# Patient Record
Sex: FEMALE | Race: WHITE
[De-identification: names, ages, dates, MRNs, and addresses within clinical notes are randomized per-mention and may not be internally consistent; named-entity substitution may affect disease eponyms.]

---

## 2017-05-11 ENCOUNTER — APPOINTMENT (OUTPATIENT)
Dept: PULMONOLOGY | Facility: CLINIC | Age: 72
End: 2017-05-11

## 2017-05-11 VITALS
BODY MASS INDEX: 18.78 KG/M2 | HEART RATE: 49 BPM | RESPIRATION RATE: 16 BRPM | HEIGHT: 64 IN | SYSTOLIC BLOOD PRESSURE: 132 MMHG | DIASTOLIC BLOOD PRESSURE: 70 MMHG | WEIGHT: 110 LBS | OXYGEN SATURATION: 99 %

## 2017-05-11 DIAGNOSIS — Z80.0 FAMILY HISTORY OF MALIGNANT NEOPLASM OF DIGESTIVE ORGANS: ICD-10-CM

## 2017-05-11 DIAGNOSIS — M50.321 OTHER CERVICAL DISC DEGENERATION AT C4-C5 LEVEL: ICD-10-CM

## 2017-05-11 DIAGNOSIS — Z78.9 OTHER SPECIFIED HEALTH STATUS: ICD-10-CM

## 2017-05-11 DIAGNOSIS — Z87.891 PERSONAL HISTORY OF NICOTINE DEPENDENCE: ICD-10-CM

## 2017-05-11 DIAGNOSIS — M43.10 SPONDYLOLISTHESIS, SITE UNSPECIFIED: ICD-10-CM

## 2017-05-11 DIAGNOSIS — O00.90 UNSPECIFIED. ECTOPIC. PREGNANCY WITHOUT INTRAUTERINE PREGNANCY: ICD-10-CM

## 2017-05-11 DIAGNOSIS — Z87.01 PERSONAL HISTORY OF PNEUMONIA (RECURRENT): ICD-10-CM

## 2017-05-11 DIAGNOSIS — Z81.8 FAMILY HISTORY OF OTHER MENTAL AND BEHAVIORAL DISORDERS: ICD-10-CM

## 2017-05-11 RX ORDER — POTASSIUM CITRATE 15 MEQ/1
15 MEQ TABLET, EXTENDED RELEASE ORAL
Qty: 60 | Refills: 0 | Status: ACTIVE | COMMUNITY
Start: 2017-04-25

## 2017-05-11 RX ORDER — FAMOTIDINE 40 MG/1
40 TABLET, FILM COATED ORAL
Qty: 30 | Refills: 0 | Status: ACTIVE | COMMUNITY
Start: 2017-03-16

## 2017-05-11 RX ORDER — CICLOPIROX 80 MG/ML
8 SOLUTION TOPICAL
Qty: 1 | Refills: 0 | Status: ACTIVE | COMMUNITY
Start: 2017-03-20

## 2017-09-14 ENCOUNTER — APPOINTMENT (OUTPATIENT)
Dept: PULMONOLOGY | Facility: CLINIC | Age: 72
End: 2017-09-14
Payer: MEDICARE

## 2017-09-14 VITALS
OXYGEN SATURATION: 98 % | HEIGHT: 64 IN | HEART RATE: 60 BPM | BODY MASS INDEX: 18.44 KG/M2 | SYSTOLIC BLOOD PRESSURE: 100 MMHG | RESPIRATION RATE: 17 BRPM | WEIGHT: 108 LBS | DIASTOLIC BLOOD PRESSURE: 72 MMHG

## 2017-09-14 DIAGNOSIS — Z87.01 PERSONAL HISTORY OF PNEUMONIA (RECURRENT): ICD-10-CM

## 2017-09-14 PROCEDURE — 94010 BREATHING CAPACITY TEST: CPT

## 2017-09-14 PROCEDURE — 99214 OFFICE O/P EST MOD 30 MIN: CPT | Mod: 25

## 2017-09-18 ENCOUNTER — APPOINTMENT (OUTPATIENT)
Dept: NEPHROLOGY | Facility: CLINIC | Age: 72
End: 2017-09-18
Payer: MEDICARE

## 2017-09-18 VITALS
HEIGHT: 64 IN | WEIGHT: 108 LBS | SYSTOLIC BLOOD PRESSURE: 118 MMHG | DIASTOLIC BLOOD PRESSURE: 64 MMHG | BODY MASS INDEX: 18.44 KG/M2

## 2017-09-18 DIAGNOSIS — Z87.442 PERSONAL HISTORY OF URINARY CALCULI: ICD-10-CM

## 2017-09-18 DIAGNOSIS — R82.99 OTHER ABNORMAL FINDINGS IN URINE: ICD-10-CM

## 2017-09-18 DIAGNOSIS — E72.53 PRIMARY HYPEROXALURIA: ICD-10-CM

## 2017-09-18 DIAGNOSIS — Z85.820 PERSONAL HISTORY OF MALIGNANT MELANOMA OF SKIN: ICD-10-CM

## 2017-09-18 PROCEDURE — 99205 OFFICE O/P NEW HI 60 MIN: CPT

## 2017-09-18 RX ORDER — LORATADINE 10 MG
TABLET,DISINTEGRATING ORAL
Refills: 0 | Status: ACTIVE | COMMUNITY

## 2017-09-18 RX ORDER — DOCUSATE SODIUM 100 MG/1
CAPSULE ORAL
Refills: 0 | Status: ACTIVE | COMMUNITY

## 2017-09-18 RX ORDER — VITAMIN B COMPLEX
CAPSULE ORAL
Refills: 0 | Status: ACTIVE | COMMUNITY

## 2017-09-21 PROBLEM — Z85.820 HISTORY OF MALIGNANT MELANOMA OF SKIN: Status: RESOLVED | Noted: 2017-09-21 | Resolved: 2017-09-21

## 2018-01-16 ENCOUNTER — APPOINTMENT (OUTPATIENT)
Dept: PULMONOLOGY | Facility: CLINIC | Age: 73
End: 2018-01-16
Payer: MEDICARE

## 2018-01-16 VITALS
BODY MASS INDEX: 18.44 KG/M2 | SYSTOLIC BLOOD PRESSURE: 118 MMHG | OXYGEN SATURATION: 97 % | HEART RATE: 61 BPM | HEIGHT: 64 IN | WEIGHT: 108 LBS | DIASTOLIC BLOOD PRESSURE: 70 MMHG

## 2018-01-16 PROCEDURE — 94010 BREATHING CAPACITY TEST: CPT

## 2018-01-16 PROCEDURE — 99214 OFFICE O/P EST MOD 30 MIN: CPT | Mod: 25

## 2018-01-19 ENCOUNTER — LABORATORY RESULT (OUTPATIENT)
Age: 73
End: 2018-01-19

## 2018-01-19 ENCOUNTER — APPOINTMENT (OUTPATIENT)
Dept: CARDIOLOGY | Facility: CLINIC | Age: 73
End: 2018-01-19
Payer: MEDICARE

## 2018-01-19 PROCEDURE — 93880 EXTRACRANIAL BILAT STUDY: CPT

## 2018-01-20 LAB
BASOPHILS # BLD AUTO: 0.03 K/UL
BASOPHILS NFR BLD AUTO: 0.6 %
EOSINOPHIL # BLD AUTO: 0.07 K/UL
EOSINOPHIL NFR BLD AUTO: 1.4 %
HCT VFR BLD CALC: 35.5 %
HGB BLD-MCNC: 11.7 G/DL
IGE SER-MCNC: 35 IU/ML
IMM GRANULOCYTES NFR BLD AUTO: 0.2 %
LYMPHOCYTES # BLD AUTO: 1.51 K/UL
LYMPHOCYTES NFR BLD AUTO: 31 %
MAN DIFF?: NORMAL
MCHC RBC-ENTMCNC: 30.9 PG
MCHC RBC-ENTMCNC: 33 GM/DL
MCV RBC AUTO: 93.7 FL
MONOCYTES # BLD AUTO: 0.48 K/UL
MONOCYTES NFR BLD AUTO: 9.9 %
NEUTROPHILS # BLD AUTO: 2.77 K/UL
NEUTROPHILS NFR BLD AUTO: 56.9 %
PLATELET # BLD AUTO: 326 K/UL
RBC # BLD: 3.79 M/UL
RBC # FLD: 15.1 %
WBC # FLD AUTO: 4.87 K/UL

## 2018-01-22 LAB
A ALTERNATA IGE QN: <0.1 KUA/L
A FUMIGATUS IGE QN: <0.1 KUA/L
C ALBICANS IGE QN: <0.1 KUA/L
C HERBARUM IGE QN: <0.1 KUA/L
CAT DANDER IGE QN: <0.1 KUA/L
CLAM IGE QN: <0.1 KUA/L
CODFISH IGE QN: <0.1 KUA/L
COMMON RAGWEED IGE QN: <0.1 KUA/L
CORN IGE QN: <0.1 KUA/L
COW MILK IGE QN: <0.1 KUA/L
D FARINAE IGE QN: <0.1 KUA/L
D PTERONYSS IGE QN: <0.1 KUA/L
DEPRECATED A ALTERNATA IGE RAST QL: 0
DEPRECATED A FUMIGATUS IGE RAST QL: 0
DEPRECATED C ALBICANS IGE RAST QL: 0
DEPRECATED C HERBARUM IGE RAST QL: 0
DEPRECATED CAT DANDER IGE RAST QL: 0
DEPRECATED CLAM IGE RAST QL: 0
DEPRECATED CODFISH IGE RAST QL: 0
DEPRECATED COMMON RAGWEED IGE RAST QL: 0
DEPRECATED CORN IGE RAST QL: 0
DEPRECATED COW MILK IGE RAST QL: 0
DEPRECATED D FARINAE IGE RAST QL: 0
DEPRECATED D PTERONYSS IGE RAST QL: 0
DEPRECATED DOG DANDER IGE RAST QL: 0
DEPRECATED EGG WHITE IGE RAST QL: 0
DEPRECATED M RACEMOSUS IGE RAST QL: 0
DEPRECATED PEANUT IGE RAST QL: 0
DEPRECATED ROACH IGE RAST QL: 0
DEPRECATED SCALLOP IGE RAST QL: <0.1 KUA/L
DEPRECATED SESAME SEED IGE RAST QL: 0
DEPRECATED SHRIMP IGE RAST QL: 0
DEPRECATED SOYBEAN IGE RAST QL: 0
DEPRECATED TIMOTHY IGE RAST QL: 0
DEPRECATED WALNUT IGE RAST QL: 0
DEPRECATED WHEAT IGE RAST QL: 0
DEPRECATED WHITE OAK IGE RAST QL: 0
DOG DANDER IGE QN: <0.1 KUA/L
EGG WHITE IGE QN: <0.1 KUA/L
M RACEMOSUS IGE QN: <0.1 KUA/L
PEANUT IGE QN: <0.1 KUA/L
ROACH IGE QN: <0.1 KUA/L
SCALLOP IGE QN: 0
SCALLOP IGE QN: <0.1 KUA/L
SESAME SEED IGE QN: <0.1 KUA/L
SOYBEAN IGE QN: <0.1 KUA/L
TIMOTHY IGE QN: <0.1 KUA/L
WALNUT IGE QN: <0.1 KUA/L
WHEAT IGE QN: <0.1 KUA/L
WHITE OAK IGE QN: <0.1 KUA/L

## 2018-01-23 ENCOUNTER — MOBILE ON CALL (OUTPATIENT)
Age: 73
End: 2018-01-23

## 2018-07-24 ENCOUNTER — APPOINTMENT (OUTPATIENT)
Dept: PULMONOLOGY | Facility: CLINIC | Age: 73
End: 2018-07-24
Payer: MEDICARE

## 2018-07-24 ENCOUNTER — NON-APPOINTMENT (OUTPATIENT)
Age: 73
End: 2018-07-24

## 2018-07-24 VITALS
HEIGHT: 64 IN | BODY MASS INDEX: 18.78 KG/M2 | WEIGHT: 110 LBS | SYSTOLIC BLOOD PRESSURE: 120 MMHG | DIASTOLIC BLOOD PRESSURE: 82 MMHG | RESPIRATION RATE: 14 BRPM | HEART RATE: 55 BPM | OXYGEN SATURATION: 99 %

## 2018-07-24 PROCEDURE — 99214 OFFICE O/P EST MOD 30 MIN: CPT | Mod: 25

## 2018-07-24 PROCEDURE — 94010 BREATHING CAPACITY TEST: CPT

## 2019-01-15 ENCOUNTER — APPOINTMENT (OUTPATIENT)
Dept: PULMONOLOGY | Facility: CLINIC | Age: 74
End: 2019-01-15
Payer: MEDICARE

## 2019-01-15 ENCOUNTER — NON-APPOINTMENT (OUTPATIENT)
Age: 74
End: 2019-01-15

## 2019-01-15 VITALS
BODY MASS INDEX: 1.88 KG/M2 | DIASTOLIC BLOOD PRESSURE: 60 MMHG | OXYGEN SATURATION: 98 % | RESPIRATION RATE: 17 BRPM | SYSTOLIC BLOOD PRESSURE: 122 MMHG | HEIGHT: 64 IN | HEART RATE: 58 BPM | WEIGHT: 11 LBS

## 2019-01-15 PROCEDURE — 94010 BREATHING CAPACITY TEST: CPT

## 2019-01-15 PROCEDURE — 99214 OFFICE O/P EST MOD 30 MIN: CPT | Mod: 25

## 2019-01-15 NOTE — HISTORY OF PRESENT ILLNESS
[FreeTextEntry1] : Ms. Peterson is a 73 year old female with a history of abnormal chest CT, allergic reaction, asbestos exposure, GERD and palpitations presenting to the office today for a follow up visit. Her chief complaint is back issues \par - SHe comes in stating that she is feeling generally well.\par - She is exercising regularly and notes only mild limitations related to body stiffness. \par - Her energy level is 8-9/10\par - She describes 3x per year in which she experiences palpitations for about 5 seconds\par - Her heartburn and reflux is under control\par - SHe is sleeping well and getting enough sleep (7-8 hours)\par - She reports waking up rested. \par - Her sinuses are fine. \par - She denies any headaches, nausea, vomiting, fever, chills, sweats, chest pain, chest pressure, SOB, coughing, wheezing, fatigue, diarrhea, constipation, dysphagia, myalgias, dizziness, leg swelling, leg pain, itchy eyes, itchy ears, or sour taste in the mouth.

## 2019-01-15 NOTE — REASON FOR VISIT
[Follow-Up] : a follow-up visit [Spouse] : spouse [FreeTextEntry1] : abnormal chest CT, allergic reaction, asbestos exposure, GERD and palpitations

## 2019-01-15 NOTE — PROCEDURE
[FreeTextEntry1] : PFT- spi reveals normal flows; FEV1 was 2.05L which is 96% of predicted; normal flow volume loop \par \par She had a CT chest scan performed on 1/8/2019, which showed no change from CT scan 7/9/2018.

## 2019-01-15 NOTE — ASSESSMENT
[FreeTextEntry1] : Ms. Peterson is a 72 year old female with a history of spondyloschises , HLD, palpitations, abnormal chest CT here for a pulmonary reevaluation and is stable. 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 No gold standard test for diagnosis. The tuberculin skin test limited sensitivity and specificity, as there is  false positives in people who have received BCG; false negatives in people with impaired T-Cell infection. Interferon gamma- release assays, more specific; similar sensitivity to TB skin test (not influenced by BCG).\par \par problem 3: GERD/ LPR \par -continue to use Pepcid 30 mg QAM \par -recommended to use DGL before meals\par -hand outs provided to the pt \par \par -Rule of 2s: avoid eating too much, eating too late, eating too spicy, eating two hours before bed\par -Things to avoid including overeating, spicy foods, tight clothing, eating within three hours of bed, this list is not all inclusive. \par -For treatment of reflux, possible options discussed including diet control, H2 blockers, PPIs, as well as coating motility agents discussed as treatment options. Timing of meals and proximity of last meal to sleep were discussed. If symptoms persist, a formal gastrointestinal evaluation is needed. \par \par Problem 4: Abnormal PFT's \par -improved at this visit and continue to improve - best at present \par \par problem 6: allergic profile\par - S/p blood work to include: IgE level, eosinophil level, food IgE level, asthma profile, and vitamin D (normal) \par -recommended to use "Navage" nasal rinse device\par -continue Flonase / Astelin 0.5%  1 sniff BID \par \par problem 5: health maintenance \par - Received an influenza vaccine 2018\par -recommended strep pneumonia vaccines: Prevnar-13 vaccine, followed by Pneumo vaccine 23 one year following\par -recommended early intervention for URIs\par -recommended regular osteoporosis evaluations\par -recommended early dermatological evaluations\par -recommended after the age of 50 to the age of 70, colonoscopy every 5 years \par \par F/U in 3-4 months \par She is encouraged to call with any changes, concerns, or questions

## 2019-10-04 ENCOUNTER — NON-APPOINTMENT (OUTPATIENT)
Age: 74
End: 2019-10-04

## 2019-10-04 ENCOUNTER — APPOINTMENT (OUTPATIENT)
Dept: PULMONOLOGY | Facility: CLINIC | Age: 74
End: 2019-10-04
Payer: MEDICARE

## 2019-10-04 VITALS
WEIGHT: 110 LBS | SYSTOLIC BLOOD PRESSURE: 120 MMHG | RESPIRATION RATE: 16 BRPM | OXYGEN SATURATION: 99 % | HEART RATE: 62 BPM | HEIGHT: 64 IN | DIASTOLIC BLOOD PRESSURE: 85 MMHG | BODY MASS INDEX: 18.78 KG/M2

## 2019-10-04 DIAGNOSIS — Z23 ENCOUNTER FOR IMMUNIZATION: ICD-10-CM

## 2019-10-04 DIAGNOSIS — R00.2 PALPITATIONS: ICD-10-CM

## 2019-10-04 PROCEDURE — 94010 BREATHING CAPACITY TEST: CPT

## 2019-10-04 PROCEDURE — 99214 OFFICE O/P EST MOD 30 MIN: CPT | Mod: 25

## 2019-10-04 PROCEDURE — G0008: CPT

## 2019-10-04 PROCEDURE — 95012 NITRIC OXIDE EXP GAS DETER: CPT

## 2019-10-04 PROCEDURE — 90662 IIV NO PRSV INCREASED AG IM: CPT

## 2019-10-04 NOTE — REVIEW OF SYSTEMS
[Negative] : Sleep Disorder [As Noted in HPI] : as noted in HPI [Myalgias] : myalgias [Arthralgias] : arthralgias [Chest Discomfort] : no chest discomfort [Heartburn] : no heartburn [Reflux] : no reflux [Dysphagia] : no dysphagia [Constipation] : no constipation [Diarrhea] : no diarrhea

## 2019-10-04 NOTE — PROCEDURE
[FreeTextEntry1] : PFT revealed normal flows, with a FEV1 of 2.20L, which is 104% of predicted, with a normal flow volume loop \par \par FENO was 20; a normal value being less than 25\par Fractional exhaled nitric oxide (FENO) is regarded as a simple, noninvasive method for assessing eosinophilic airway inflammation. Produced by a variety of cells within the lung, nitric oxide (NO) concentrations are generally low in healthy individuals. However, high concentrations of NO appear to be involved in nonspecific host defense mechanisms and chronic inflammatory diseases such as asthma. The American Thoracic Society (ATS) therefore has recommended using FENO to aid in the diagnosis and monitoring of eosinophilic airway inflammation and asthma, and for identifying steroid responsive individuals whose chronic respiratory symptoms may be caused by airway inflammation. \par \par Chest CT (1.8.19) reveals no change from CT CHEST WITHOUT IV CONTRAST with report dated 7/9/2018 2:10 PM.

## 2019-10-04 NOTE — PHYSICAL EXAM
[General Appearance - Well Developed] : well developed [Normal Appearance] : normal appearance [General Appearance - Well Nourished] : well nourished [Well Groomed] : well groomed [No Deformities] : no deformities [General Appearance - In No Acute Distress] : no acute distress [Normal Conjunctiva] : the conjunctiva exhibited no abnormalities [Eyelids - No Xanthelasma] : the eyelids demonstrated no xanthelasmas [Normal Oropharynx] : normal oropharynx [II] : II [Neck Cervical Mass (___cm)] : no neck mass was observed [Neck Appearance] : the appearance of the neck was normal [Thyroid Diffuse Enlargement] : the thyroid was not enlarged [Jugular Venous Distention Increased] : there was no jugular-venous distention [Thyroid Nodule] : there were no palpable thyroid nodules [Heart Rate And Rhythm] : heart rate and rhythm were normal [Heart Sounds] : normal S1 and S2 [Murmurs] : no murmurs present [Respiration, Rhythm And Depth] : normal respiratory rhythm and effort [Exaggerated Use Of Accessory Muscles For Inspiration] : no accessory muscle use [Auscultation Breath Sounds / Voice Sounds] : lungs were clear to auscultation bilaterally [Abdomen Soft] : soft [Abdomen Tenderness] : non-tender [Abdomen Mass (___ Cm)] : no abdominal mass palpated [Abnormal Walk] : normal gait [Gait - Sufficient For Exercise Testing] : the gait was sufficient for exercise testing [Nail Clubbing] : no clubbing of the fingernails [Cyanosis, Localized] : no localized cyanosis [Petechial Hemorrhages (___cm)] : no petechial hemorrhages [Skin Color & Pigmentation] : normal skin color and pigmentation [] : no rash [No Venous Stasis] : no venous stasis [Skin Lesions] : no skin lesions [No Skin Ulcers] : no skin ulcer [No Xanthoma] : no  xanthoma was observed [Deep Tendon Reflexes (DTR)] : deep tendon reflexes were 2+ and symmetric [Sensation] : the sensory exam was normal to light touch and pinprick [No Focal Deficits] : no focal deficits [Oriented To Time, Place, And Person] : oriented to person, place, and time [Impaired Insight] : insight and judgment were intact [Affect] : the affect was normal [FreeTextEntry1] : I:E ratio 1:3; clear

## 2019-10-04 NOTE — HISTORY OF PRESENT ILLNESS
[FreeTextEntry1] : Ms. Peterson is a 74 year old female with a history of abnormal chest CT, allergic reaction, asbestos exposure, GERD and palpitations presenting to the office today for a follow up visit. Her chief complaint is neck pain.\par -she reports feeling generally well\par -she notes she sleeps well most nights, and wakes up with nocturia occasionally\par -she notes her appetite is good and her weight is stable\par -she notes her energy level is 6/10 due to the holiday season\par -she reports having neck pain\par -she reports exercising by going to PT for her shoulder, and doing cross country or treadmill\par -she notes she is still on Miralax\par -she denies any coughing, wheezing, chest pain, chest pressure, diarrhea, constipation, dysphagia, dizziness, sour taste in the mouth

## 2019-10-04 NOTE — ADDENDUM
[FreeTextEntry1] : Documented by Jamel Skinner acting as a scribe for Dr. Martín Call on 10/04/2019.\par \par All medical record entries made by the Scribe were at my, Dr. Martín Call's, direction and personally dictated by me on 10/04/2019. I have reviewed the chart and agree that the record accurately reflects my personal performance of the history, physical exam, assessment and plan. I have also personally directed, reviewed, and agree with the discharge instructions.

## 2019-10-04 NOTE — ASSESSMENT
[FreeTextEntry1] : Ms. Peterson is a 72 year old female with a history of spondyloschises , HLD, palpitations, abnormal chest CT here for a pulmonary reevaluation and is stable. 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 No gold standard test for diagnosis. The tuberculin skin test limited sensitivity and specificity, as there is  false positives in people who have received BCG; false negatives in people with impaired T-Cell infection. Interferon gamma- release assays, more specific; similar sensitivity to TB skin test (not influenced by BCG).\par \par problem 3: GERD/ LPR \par -continue to use Pepcid 30 mg QAM \par -recommended to use DGL before meals\par -hand outs provided to the pt \par \par -Rule of 2s: avoid eating too much, eating too late, eating too spicy, eating two hours before bed\par -Things to avoid including overeating, spicy foods, tight clothing, eating within three hours of bed, this list is not all inclusive. \par -For treatment of reflux, possible options discussed including diet control, H2 blockers, PPIs, as well as coating motility agents discussed as treatment options. Timing of meals and proximity of last meal to sleep were discussed. If symptoms persist, a formal gastrointestinal evaluation is needed. \par \par Problem 4: Abnormal PFT's \par -improved at this visit and continue to improve - best at present \par \par problem 6: allergic profile\par - S/p blood work to include: IgE level, eosinophil level, food IgE level, asthma profile, and vitamin D (normal) \par -recommended to use "Navage" nasal rinse device\par -continue Flonase / Astelin 0.5%  1 sniff BID \par \par problem 5: health maintenance \par - Received an influenza vaccine 2018\par -recommended strep pneumonia vaccines: Prevnar-13 vaccine, followed by Pneumo vaccine 23 one year following\par -recommended early intervention for URIs\par -recommended regular osteoporosis evaluations\par -recommended early dermatological evaluations\par -recommended after the age of 50 to the age of 70, colonoscopy every 5 years \par \par F/U in 9 months with SPI, and get a CT in 1 year\par She is encouraged to call with any changes, concerns, or questions

## 2020-10-05 ENCOUNTER — APPOINTMENT (OUTPATIENT)
Dept: PULMONOLOGY | Facility: CLINIC | Age: 75
End: 2020-10-05
Payer: MEDICARE

## 2020-10-05 VITALS
WEIGHT: 110 LBS | HEART RATE: 60 BPM | OXYGEN SATURATION: 99 % | TEMPERATURE: 97.3 F | DIASTOLIC BLOOD PRESSURE: 70 MMHG | SYSTOLIC BLOOD PRESSURE: 120 MMHG | BODY MASS INDEX: 18.78 KG/M2 | RESPIRATION RATE: 16 BRPM | HEIGHT: 64 IN

## 2020-10-05 PROCEDURE — 99214 OFFICE O/P EST MOD 30 MIN: CPT | Mod: 25

## 2020-10-05 PROCEDURE — 95012 NITRIC OXIDE EXP GAS DETER: CPT

## 2020-10-05 NOTE — HISTORY OF PRESENT ILLNESS
[FreeTextEntry1] : Ms. Peterson is a 75 year old female with a history of abnormal chest CT, allergic reaction, asbestos exposure, GERD and palpitations presenting to the office today for a follow up visit. Her chief complaint is\par - she has been feeling well \par - she has been walking for exercise\par - her back as been good \par - she has been playing tennis 4 times a week \par - no itchy eyes / ears \par - energy levels are about 9 out of 10 \par - she has been having some post nasal drip and a scratchy throat\par - she notes the bowels are regular, she takes MiraLAX \par - she has cut famotidine in half \par - She  denies any visual issues, headaches, nausea, vomiting, fever, chills, sweats, chest pains, chest pressure, diarrhea, constipation, dysphagia, myalgia, dizziness, leg swelling, leg pain, itchy eyes, itchy ears, heartburn, reflux, or sour taste in the mouth.

## 2020-10-05 NOTE — PROCEDURE
[FreeTextEntry1] :  FENO was 25; normal value being less than 25\par Fractional exhaled nitric oxide (FENO) is regarded as a simple, noninvasive method for assessing eosinophilic airway inflammation. Produced by a variety of cells within the lung, nitric oxide (NO) concentrations are generally low in healthy individuals. However, high concentrations of NO appear to be involved in nonspecific host defense mechanisms and chronic inflammatory diseases such as asthma. The American Thoracic Society (ATS) therefore has recommended using FENO to aid in the diagnosis and monitoring of eosinophilic airway inflammation and asthma, and for identifying steroid responsive individuals whose chronic respiratory symptoms may be airway inflammation.\par

## 2020-10-05 NOTE — ASSESSMENT
[FreeTextEntry1] : Ms. Peterson is a 75 year old female with a history of GERD, spondyloschises , HLD, palpitations, abnormal chest CT here for a pulmonary reevaluation and is stable. 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 No gold standard test for diagnosis. The tuberculin skin test limited sensitivity and specificity, as there is  false positives in people who have received BCG; false negatives in people with impaired T-Cell infection. Interferon gamma- release assays, more specific; similar sensitivity to TB skin test (not influenced by BCG).\par \par problem 3: GERD/ LPR \par -continue to use Pepcid 30 mg QAM \par -recommended to use DGL before meals\par -hand outs provided to the pt \par \par -Rule of 2s: avoid eating too much, eating too late, eating too spicy, eating two hours before bed\par -Things to avoid including overeating, spicy foods, tight clothing, eating within three hours of bed, this list is not all inclusive. \par -For treatment of reflux, possible options discussed including diet control, H2 blockers, PPIs, as well as coating motility agents discussed as treatment options. Timing of meals and proximity of last meal to sleep were discussed. If symptoms persist, a formal gastrointestinal evaluation is needed. \par \par Problem 4: Abnormal PFT's \par -improved at this visit and continue to improve - best at present \par \par problem 6: allergic profile\par - S/p blood work to include: IgE level, eosinophil level, food IgE level, asthma profile, and vitamin D (normal) \par -recommended to use "Navage" nasal rinse device\par -continue Flonase / Astelin 0.5%  1 sniff BID \par \par problem 5: health maintenance \par - Received an influenza vaccine 2020\par -recommended strep pneumonia vaccines: Prevnar-13 vaccine, followed by Pneumo vaccine 23 one year following\par -recommended early intervention for URIs\par -recommended regular osteoporosis evaluations\par -recommended early dermatological evaluations\par -recommended after the age of 50 to the age of 70, colonoscopy every 5 years \par \par F/U in 9 months with SPI, and get a CT in 1 year\par She is encouraged to call with any changes, concerns, or questions

## 2020-10-05 NOTE — ADDENDUM
[FreeTextEntry1] : Documented by Danyell Lynn acting as a scribe for Dr. Martín Call on 10/05/2020 \par \par All medical record entries made by the Scribe were at my, Dr. Martín Call's, direction and personally dictated by me on 10/05/2020 . I have reviewed the chart and agree that the record accurately reflects my personal performance of the history, physical exam, assessment and plan. I have also personally directed, reviewed, and agree with the discharge instructions.

## 2021-01-15 ENCOUNTER — APPOINTMENT (OUTPATIENT)
Dept: SURGERY | Facility: CLINIC | Age: 76
End: 2021-01-15

## 2021-04-01 DIAGNOSIS — Z01.812 ENCOUNTER FOR PREPROCEDURAL LABORATORY EXAMINATION: ICD-10-CM

## 2021-04-08 LAB — SARS-COV-2 N GENE NPH QL NAA+PROBE: NOT DETECTED

## 2021-04-12 ENCOUNTER — APPOINTMENT (OUTPATIENT)
Dept: PULMONOLOGY | Facility: CLINIC | Age: 76
End: 2021-04-12
Payer: MEDICARE

## 2021-04-12 ENCOUNTER — NON-APPOINTMENT (OUTPATIENT)
Age: 76
End: 2021-04-12

## 2021-04-12 VITALS
BODY MASS INDEX: 19.49 KG/M2 | SYSTOLIC BLOOD PRESSURE: 110 MMHG | RESPIRATION RATE: 16 BRPM | HEART RATE: 62 BPM | HEIGHT: 63 IN | DIASTOLIC BLOOD PRESSURE: 20 MMHG | TEMPERATURE: 97.4 F | WEIGHT: 110 LBS | OXYGEN SATURATION: 98 %

## 2021-04-12 PROCEDURE — ZZZZZ: CPT

## 2021-04-12 PROCEDURE — 99214 OFFICE O/P EST MOD 30 MIN: CPT | Mod: 25

## 2021-04-12 PROCEDURE — 94010 BREATHING CAPACITY TEST: CPT

## 2021-04-12 NOTE — ADDENDUM
[FreeTextEntry1] : Documented by Migel Poole acting as a scribe for Dr. Martín Call on 04/12/2021.\par \par All medical record entries made by the Scribe were at my, Dr. Martín Call's, direction and personally dictated by me on 04/12/2021 . I have reviewed the chart and agree that the record accurately reflects my personal performance of the history, physical exam, assessment and plan. I have also personally directed, reviewed, and agree with the discharge instructions. \par

## 2021-04-12 NOTE — ASSESSMENT
[FreeTextEntry1] : Ms. Peterson is a 76 year old female with a history of GERD, spondyloschises , HLD, palpitations, abnormal chest CT here for a pulmonary reevaluation and is stable. 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 Risks, benefits were reviewed in detail. The guidelines for abnormalities include follow up CT scans at various intervals which could range from 6 weeks to 1 year intervals. If there is a change for the worse then consideration for a biopsy will be considered if you are a candidate. Second opinion evaluation with a thoracic surgeon or an interventional radiologist could be offered. \par \par problem 2: TB\par - TB skin test- negative PPD \par \par -As for the latent TB infection, 5-13% will go on to develop active disease. No gold standard test for diagnosis. The tuberculin skin test limited sensitivity and specificity, as there is  false positives in people who have received BCG; false negatives in people with impaired T-Cell infection. Interferon gamma- release assays, more specific; similar sensitivity to TB skin test (not influenced by BCG).\par \par problem 3: GERD/ LPR \par -continue to use Pepcid 30 mg QAM \par -recommended to use DGL before meals\par -hand outs provided to the pt \par \par -Rule of 2s: avoid eating too much, eating too late, eating too spicy, eating two hours before bed\par -Things to avoid including overeating, spicy foods, tight clothing, eating within three hours of bed, this list is not all inclusive. \par -For treatment of reflux, possible options discussed including diet control, H2 blockers, PPIs, as well as coating motility agents discussed as treatment options. Timing of meals and proximity of last meal to sleep were discussed. If symptoms persist, a formal gastrointestinal evaluation is needed. \par \par Problem 4: Abnormal PFT's \par -improved at this visit and continue to improve - best at present \par \par problem 5: allergic profile\par - S/p blood work to include: IgE level, eosinophil level, food IgE level, asthma profile, and vitamin D (normal) \par -recommended to use "Navage" nasal rinse device\par -continue Flonase / Astelin 0.5%  1 sniff BID \par \par problem 6: health maintenance \par -s/p COVID 19 vaccine Pfizer x 2\par - Received an influenza vaccine 2020\par -recommended strep pneumonia vaccines: Prevnar-13 vaccine, followed by Pneumo vaccine 23 one year following\par -recommended early intervention for URIs\par -recommended regular osteoporosis evaluations\par -recommended early dermatological evaluations\par -recommended after the age of 50 to the age of 70, colonoscopy every 5 years \par \par F/U in 9 months with SPI, and get a CT in 1 year\par She is encouraged to call with any changes, concerns, or questions

## 2021-04-12 NOTE — HISTORY OF PRESENT ILLNESS
[FreeTextEntry1] : Ms. Peterson is a 76 year old female with a history of abnormal chest CT, allergic reaction, asbestos exposure, GERD and palpitations presenting to the office today for a follow up visit. Her chief complaint is\par \par -she notes generally feeling well\par -she notes regular exercise daily without limitations\par -she notes fluctuating between diarrhea and constipation controlled with MiraLAX\par -she notes energy levels stable\par -she notes intermittent increased belching\par -she notes mild intermittent heartburn and reflux controlled with Pepcid\par -she notes off biotin and folic acid due to elevated Liver numbers\par -she notes recent travel to [place] and [place]\par -\par \par \par -denies any chest pain, chest pressure, dysphagia, sour taste in the mouth, dizziness, leg swelling, leg pain, myalgias, arthralgias, itchy eyes, itchy ears.\par

## 2021-04-12 NOTE — PROCEDURE
[FreeTextEntry1] : \par PFT revealed normal flows, with a FEV1 of 1.96 L, which is 101 % of predicted, normal lung volumes, and with a normal flow volume loop.

## 2021-04-26 ENCOUNTER — TRANSCRIPTION ENCOUNTER (OUTPATIENT)
Age: 76
End: 2021-04-26

## 2021-07-14 ENCOUNTER — TRANSCRIPTION ENCOUNTER (OUTPATIENT)
Age: 76
End: 2021-07-14

## 2021-07-15 ENCOUNTER — NON-APPOINTMENT (OUTPATIENT)
Age: 76
End: 2021-07-15

## 2021-10-18 ENCOUNTER — APPOINTMENT (OUTPATIENT)
Dept: PULMONOLOGY | Facility: CLINIC | Age: 76
End: 2021-10-18
Payer: MEDICARE

## 2021-10-18 VITALS
DIASTOLIC BLOOD PRESSURE: 80 MMHG | SYSTOLIC BLOOD PRESSURE: 130 MMHG | WEIGHT: 108 LBS | HEIGHT: 63 IN | BODY MASS INDEX: 19.14 KG/M2 | RESPIRATION RATE: 16 BRPM | OXYGEN SATURATION: 98 % | HEART RATE: 60 BPM

## 2021-10-18 PROCEDURE — 99214 OFFICE O/P EST MOD 30 MIN: CPT

## 2021-10-18 NOTE — ASSESSMENT
[FreeTextEntry1] : Ms. Peterson is a 76 year old female with a history of GERD, spondyloschises , HLD, palpitations, abnormal chest CT here for a pulmonary reevaluation and is stable. 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 Risks, benefits were reviewed in detail. The guidelines for abnormalities include follow up CT scans at various intervals which could range from 6 weeks to 1 year intervals. If there is a change for the worse then consideration for a biopsy will be considered if you are a candidate. Second opinion evaluation with a thoracic surgeon or an interventional radiologist could be offered. \par \par problem 2: TB\par - TB skin test- negative PPD \par \par -As for the latent TB infection, 5-13% will go on to develop active disease. No gold standard test for diagnosis. The tuberculin skin test limited sensitivity and specificity, as there is  false positives in people who have received BCG; false negatives in people with impaired T-Cell infection. Interferon gamma- release assays, more specific; similar sensitivity to TB skin test (not influenced by BCG).\par \par problem 3: GERD/ LPR \par -continue to use Pepcid 30 mg QAM \par -recommended to use DGL before meals\par -hand outs provided to the pt \par \par -Rule of 2s: avoid eating too much, eating too late, eating too spicy, eating two hours before bed\par -Things to avoid including overeating, spicy foods, tight clothing, eating within three hours of bed, this list is not all inclusive. \par -For treatment of reflux, possible options discussed including diet control, H2 blockers, PPIs, as well as coating motility agents discussed as treatment options. Timing of meals and proximity of last meal to sleep were discussed. If symptoms persist, a formal gastrointestinal evaluation is needed. \par \par Problem 4: Abnormal PFT's \par -improved at this visit and continue to improve - best at present \par \par problem 5: allergic profile\par - S/p blood work to include: IgE level, eosinophil level, food IgE level, asthma profile, and vitamin D (normal) \par -recommended to use "Navage" nasal rinse device\par -continue Flonase / Astelin 0.5%  1 sniff BID \par \par problem 6: health maintenance \par -recommended Topircin cream\par -recommended theraworx\par -s/p COVID 19 vaccine Pfizer x 3\par -Covid 19 precaution, vaccine and booster discussed at length and recommended \par - Received an influenza vaccine 2020, 2021 pending \par -recommended strep pneumonia vaccines: Prevnar-13 vaccine, followed by Pneumo vaccine 23 one year following\par -recommended early intervention for URIs\par -recommended regular osteoporosis evaluations\par -recommended early dermatological evaluations\par -recommended after the age of 50 to the age of 70, colonoscopy every 5 years \par \par F/U in 9 months with SPI, and get a CT in 1 year\par She is encouraged to call with any changes, concerns, or questions

## 2021-10-18 NOTE — PROCEDURE
[FreeTextEntry1] : CT (7.8.2021) revealed focal irregular pleural thickening of the superior right major fissure, not significantly changed since 2017. Recommended continued follow up with a noncontrast CT scan of the chest in one year. 0.9 cm solid nodule of the right lower lobe also stable since 2017\par

## 2021-10-18 NOTE — ADDENDUM
[FreeTextEntry1] : Documented by Rina Doss acting as a scribe for Dr. Martín Call on 10/18/2021 \par \par All medical record entries made by the Scribe were at my, Dr. Martín Call's, direction and personally dictated by me on 10/18/2021 . I have reviewed the chart and agree that the record accurately reflects my personal performance of the history, physical exam, assessment and plan. I have also personally directed, reviewed, and agree with the discharge instructions

## 2021-10-18 NOTE — HISTORY OF PRESENT ILLNESS
[FreeTextEntry1] : Ms. Peterson is a 76 year old female with a history of abnormal chest CT, allergic reaction, asbestos exposure, GERD and palpitations presenting to the office today for a follow up visit. Her chief complaint is\par \par -she notes generally feeling good\par -she notes walking 2-3 miles a day and stretching with bands for exercise\par -she notes balance is good \par -she notes intermittent bowel issues controlled by MiraLAX.\par -she notes seeing urologist Dr Saucedo\par -she denies SOB\par -she notes foot,calf, and ankle spasms\par -she notes one episode of wrist spasms and one finger froze \par -she notes use of magnesium pills before sleep \par \par - She  denies any visual issues, headaches, nausea, vomiting, fever, chills, sweats, chest pains, chest pressure, diarrhea, constipation, dysphagia, myalgia, dizziness, leg swelling, leg pain, itchy eyes, itchy ears, heartburn, reflux, or sour taste in the mouth.

## 2022-04-11 ENCOUNTER — APPOINTMENT (OUTPATIENT)
Dept: PULMONOLOGY | Facility: CLINIC | Age: 77
End: 2022-04-11
Payer: MEDICARE

## 2022-04-11 ENCOUNTER — NON-APPOINTMENT (OUTPATIENT)
Age: 77
End: 2022-04-11

## 2022-04-11 VITALS
TEMPERATURE: 97.2 F | BODY MASS INDEX: 19.49 KG/M2 | SYSTOLIC BLOOD PRESSURE: 120 MMHG | WEIGHT: 110 LBS | OXYGEN SATURATION: 98 % | RESPIRATION RATE: 16 BRPM | HEIGHT: 63 IN | HEART RATE: 67 BPM | DIASTOLIC BLOOD PRESSURE: 60 MMHG

## 2022-04-11 PROCEDURE — 94010 BREATHING CAPACITY TEST: CPT

## 2022-04-11 PROCEDURE — 95012 NITRIC OXIDE EXP GAS DETER: CPT

## 2022-04-11 PROCEDURE — 99214 OFFICE O/P EST MOD 30 MIN: CPT | Mod: 25

## 2022-04-11 RX ORDER — OFLOXACIN 3 MG/ML
0.3 SOLUTION/ DROPS OPHTHALMIC
Qty: 5 | Refills: 0 | Status: ACTIVE | COMMUNITY
Start: 2022-03-31

## 2022-04-11 RX ORDER — TROPICAMIDE 10 MG/ML
1 SOLUTION/ DROPS OPHTHALMIC
Qty: 3 | Refills: 0 | Status: ACTIVE | COMMUNITY
Start: 2022-03-31

## 2022-04-11 RX ORDER — LOTEPREDNOL ETABONATE 3.8 MG/G
0.38 GEL OPHTHALMIC
Qty: 5 | Refills: 0 | Status: ACTIVE | COMMUNITY
Start: 2022-03-31

## 2022-04-11 RX ORDER — BROMFENAC SODIUM 0.7 MG/ML
0.07 SOLUTION/ DROPS OPHTHALMIC
Qty: 3 | Refills: 0 | Status: ACTIVE | COMMUNITY
Start: 2022-03-31

## 2022-04-11 NOTE — PROCEDURE
[FreeTextEntry1] : PFT revealed normal flows, with a FEV1 of 2.15L,which is 112% of predicted with a flattened inspiratory limb

## 2022-04-11 NOTE — ADDENDUM
[FreeTextEntry1] : Documented by Rina Doss acting as a scribe for Dr. Martní Call on 04/11/2022 \par \par All medical record entries made by the Scribe were at my, Dr. Martín Call's, direction and personally dictated by me on 04/11/2022 . I have reviewed the chart and agree that the record accurately reflects my personal performance of the history, physical exam, assessment and plan. I have also personally directed, reviewed, and agree with the discharge instructions

## 2022-04-11 NOTE — HISTORY OF PRESENT ILLNESS
[FreeTextEntry1] : Ms. Peterson is a 76 year old female with a history of abnormal chest CT, allergic reaction, asbestos exposure, GERD and palpitations presenting to the office today for a follow up visit. Her chief complaint is\par \par -she notes generally feeling good \par -she notes rare nausea due to vitamin deficiency and stomach issues \par -She notes that bowels are regular \par -she notes awaiting bilateral cataracts surgery  \par -she notes weight is stable \par -she notes walking and weight lifting of exercise \par -she notes mild arthritis \par -she notes leg issues \par \par -denies any visual issues, headaches, nausea, vomiting, fever, chills, sweats, chest pain, chest pressure, diarrhea, constipation, dysphagia, dizziness, leg swelling, leg pain, itchy eyes, itchy ears, heartburn, reflux, or sour taste in the mouth.

## 2022-04-11 NOTE — ASSESSMENT
[FreeTextEntry1] : Ms. 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 Risks, benefits were reviewed in detail. The guidelines for abnormalities include follow up CT scans at various intervals which could range from 6 weeks to 1 year intervals. If there is a change for the worse then consideration for a biopsy will be considered if you are a candidate. Second opinion evaluation with a thoracic surgeon or an interventional radiologist could be offered. \par \par problem 2: TB\par - TB skin test- negative PPD \par \par -As for the latent TB infection, 5-13% will go on to develop active disease. No gold standard test for diagnosis. The tuberculin skin test limited sensitivity and specificity, as there is  false positives in people who have received BCG; false negatives in people with impaired T-Cell infection. Interferon gamma- release assays, more specific; similar sensitivity to TB skin test (not influenced by BCG).\par \par problem 3: GERD/ LPR \par -continue to use Pepcid 30 mg QAM \par -recommended to use DGL before meals\par -hand outs provided to the pt \par \par -Rule of 2s: avoid eating too much, eating too late, eating too spicy, eating two hours before bed\par -Things to avoid including overeating, spicy foods, tight clothing, eating within three hours of bed, this list is not all inclusive. \par -For treatment of reflux, possible options discussed including diet control, H2 blockers, PPIs, as well as coating motility agents discussed as treatment options. Timing of meals and proximity of last meal to sleep were discussed. If symptoms persist, a formal gastrointestinal evaluation is needed. \par \par Problem 4: Abnormal PFT's \par -improved at this visit and continue to improve - best at present \par \par problem 5: allergic profile\par - S/p blood work to include: IgE level, eosinophil level, food IgE level, asthma profile, and vitamin D (normal) \par -recommended to use "Navage" nasal rinse device\par -continue Flonase / Astelin 0.5%  1 sniff BID \par \par problem 6: health maintenance \par -recommended Topircin cream\par -recommended theraworx\par -s/p COVID 19 vaccine Pfizer x 3\par -Covid 19 precaution, vaccine and booster discussed at length and recommended \par - Received an influenza vaccine 2020, 2021 pending \par -recommended strep pneumonia vaccines: Prevnar-13 vaccine, followed by Pneumo vaccine 23 one year following\par -recommended early intervention for URIs\par -recommended regular osteoporosis evaluations\par -recommended early dermatological evaluations\par -recommended after the age of 50 to the age of 70, colonoscopy every 5 years \par \par F/U in 9 months with SPI, and get a CT in 1 year\par She is encouraged to call with any changes, concerns, or questions

## 2023-01-04 ENCOUNTER — NON-APPOINTMENT (OUTPATIENT)
Age: 78
End: 2023-01-04

## 2023-01-04 ENCOUNTER — APPOINTMENT (OUTPATIENT)
Dept: PULMONOLOGY | Facility: CLINIC | Age: 78
End: 2023-01-04
Payer: MEDICARE

## 2023-01-04 VITALS
HEIGHT: 62 IN | WEIGHT: 109 LBS | RESPIRATION RATE: 16 BRPM | SYSTOLIC BLOOD PRESSURE: 124 MMHG | BODY MASS INDEX: 20.06 KG/M2 | TEMPERATURE: 97.4 F | HEART RATE: 62 BPM | OXYGEN SATURATION: 97 % | DIASTOLIC BLOOD PRESSURE: 80 MMHG

## 2023-01-04 PROCEDURE — 99214 OFFICE O/P EST MOD 30 MIN: CPT | Mod: 25

## 2023-01-04 PROCEDURE — 94010 BREATHING CAPACITY TEST: CPT

## 2023-01-04 PROCEDURE — 95012 NITRIC OXIDE EXP GAS DETER: CPT

## 2023-01-04 NOTE — PROCEDURE
[FreeTextEntry1] : PFT reveals normal flows, with an FEV1 of 1.94 L, which is 101 % of predicted, with a normal flow volume loop. \par \par \par FENO was 10 ; a normal value being less than 25\par Fractional exhaled nitric oxide (FENO) is regarded as a simple, noninvasive method for assessing eosinophilic airway inflammation. Produced by a variety of cells within the lung, nitric oxide (NO) concentrations are generally low in healthy individuals. However, high concentrations of NO appear to be involved in nonspecific host defense mechanisms and chronic inflammatory diseases such as asthma. The American Thoracic Society (ATS) therefore has recommended using FENO to aid in the diagnosis and monitoring of eosinophilic airway inflammation and asthma, and for identifying steroid responsive individuals whose chronic respiratory symptoms may be caused by airway inflammation.

## 2023-01-04 NOTE — HISTORY OF PRESENT ILLNESS
[FreeTextEntry1] : Ms. Peterson is a 77 year old female with a history of abnormal chest CT, allergic reaction, asbestos exposure, GERD and palpitations presenting to the office today for a follow up visit. Her chief complaint is\par \par -she notes exercising (treadmill, bands) without limitation\par -she notes feeling generally well \par -she notes vision is stable \par -she notes bowels are regular for her \par -she denies taking any new medications, vitamins, or supplements \par -she denies any covid-19 infections\par -s/p COVID-19 vaccine x5 \par -she notes good quality of sleep \par -she denies any headaches, nausea, emesis, fever, chills, sweats, chest pain, chest pressure, coughing, wheezing, palpitations, constipation, diarrhea, vertigo, dysphagia, heartburn, reflux, itchy eyes, itchy ears, leg swelling, leg pain, arthralgias, myalgias, or sour taste in the mouth.

## 2023-01-04 NOTE — PHYSICAL EXAM
[No Acute Distress] : no acute distress [Normal Oropharynx] : normal oropharynx [II] : Mallampati Class: II [Normal Appearance] : normal appearance [No Neck Mass] : no neck mass [Normal Rate/Rhythm] : normal rate/rhythm [Normal S1, S2] : normal s1, s2 [No Resp Distress] : no resp distress [Clear to Auscultation Bilaterally] : clear to auscultation bilaterally [No Abnormalities] : no abnormalities [Benign] : benign [Normal Gait] : normal gait [No Clubbing] : no clubbing [No Cyanosis] : no cyanosis [No Edema] : no edema [FROM] : FROM [Normal Color/ Pigmentation] : normal color/ pigmentation [No Focal Deficits] : no focal deficits [Oriented x3] : oriented x3 [Normal Affect] : normal affect [TextBox_54] : 2/6 systolic murmur  [TextBox_68] : I:E ratio 1:3; clear

## 2023-01-04 NOTE — ADDENDUM
[FreeTextEntry1] : Documented by LENNIE Camp acting as a scribe for Dr. Martín Call on 01/04/2023 .\par \par All medical record entries made by the Scribe were at my, Dr. Martín Call's, direction and personally dictated by me on 01/04/2023. I have reviewed the chart and agree that the record accurately reflects my personal performance of the history, physical exam, assessment and plan. I have also personally directed, reviewed, and agree with the discharge instructions.

## 2023-01-04 NOTE — ASSESSMENT
[FreeTextEntry1] : Ms. 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 Risks, benefits were reviewed in detail. The guidelines for abnormalities include follow up CT scans at various intervals which could range from 6 weeks to 1 year intervals. If there is a change for the worse then consideration for a biopsy will be considered if you are a candidate. Second opinion evaluation with a thoracic surgeon or an interventional radiologist could be offered. \par \par problem 2: TB\par - TB skin test- negative PPD \par \par -As for the latent TB infection, 5-13% will go on to develop active disease. No gold standard test for diagnosis. The tuberculin skin test limited sensitivity and specificity, as there is  false positives in people who have received BCG; false negatives in people with impaired T-Cell infection. Interferon gamma- release assays, more specific; similar sensitivity to TB skin test (not influenced by BCG).\par \par problem 3: GERD/ LPR \par -continue to use Pepcid 30 mg QAM \par -recommended to use DGL before meals\par -hand outs provided to the pt \par \par -Rule of 2s: avoid eating too much, eating too late, eating too spicy, eating two hours before bed\par -Things to avoid including overeating, spicy foods, tight clothing, eating within three hours of bed, this list is not all inclusive. \par -For treatment of reflux, possible options discussed including diet control, H2 blockers, PPIs, as well as coating motility agents discussed as treatment options. Timing of meals and proximity of last meal to sleep were discussed. If symptoms persist, a formal gastrointestinal evaluation is needed. \par \par Problem 4: Abnormal PFT's \par -improved at this visit and continue to improve - best at present \par \par problem 5: allergic profile\par - S/p blood work to include: IgE level, eosinophil level, food IgE level, asthma profile, and vitamin D (normal) \par -recommended to use "Navage" nasal rinse device\par -continue Flonase / Astelin 0.5%  1 sniff BID \par \par problem 6: health maintenance \par -recommended SaNOtize nasal spray \par -[facility] \par -recommended Topircin cream\par -recommended theraworx\par -s/p COVID 19 vaccine Pfizer x 5\par -Covid 19 precaution, vaccine and booster discussed at length and recommended \par - Received an influenza vaccine 2020, S/p 2022 \par -recommended strep pneumonia vaccines: Prevnar-13 vaccine, followed by Pneumo vaccine 23 one year following (completed)\par -S/p shringrix vaccine\par -recommended early intervention for URIs\par -recommended regular osteoporosis evaluations\par -recommended early dermatological evaluations\par -recommended after the age of 50 to the age of 70, colonoscopy every 5 years \par \par F/U in 9 months with SPI, and get a CT in 1 year\par She is encouraged to call with any changes, concerns, or questions

## 2023-02-07 ENCOUNTER — NON-APPOINTMENT (OUTPATIENT)
Age: 78
End: 2023-02-07

## 2023-07-13 ENCOUNTER — APPOINTMENT (OUTPATIENT)
Dept: PULMONOLOGY | Facility: CLINIC | Age: 78
End: 2023-07-13
Payer: MEDICARE

## 2023-07-13 VITALS
BODY MASS INDEX: 18.93 KG/M2 | HEART RATE: 77 BPM | TEMPERATURE: 97.2 F | DIASTOLIC BLOOD PRESSURE: 84 MMHG | OXYGEN SATURATION: 97 % | SYSTOLIC BLOOD PRESSURE: 132 MMHG | RESPIRATION RATE: 16 BRPM | HEIGHT: 62 IN | WEIGHT: 102.9 LBS

## 2023-07-13 PROCEDURE — 94727 GAS DIL/WSHOT DETER LNG VOL: CPT

## 2023-07-13 PROCEDURE — 99214 OFFICE O/P EST MOD 30 MIN: CPT | Mod: 25

## 2023-07-13 PROCEDURE — ZZZZZ: CPT

## 2023-07-13 PROCEDURE — 94729 DIFFUSING CAPACITY: CPT

## 2023-07-13 PROCEDURE — 95012 NITRIC OXIDE EXP GAS DETER: CPT

## 2023-07-13 PROCEDURE — 94010 BREATHING CAPACITY TEST: CPT

## 2023-07-13 NOTE — PROCEDURE
[FreeTextEntry1] : CT (jan.20.2023) IMPRESSION: since 7.8.2021, 0.9 x 0.7 cm right lower lobe, unchanged; also unchanged dating back to 1.18.2018, considered benign. 1.6 cm long area nodule pleural thickeing, superior right major fissure, unchanged in size, new focal calcification although increased size since 12/19/2017. Continued surveillance recommended. \par \par FULL PFTs reveals flows; FEV1 was  2.21L which is 122% of predicted; normal lung volumes; normal diffusion of 13.1, which is 93% of predicted; mild inspiratory loop  PFT's were performed for the evaluation of abnormal CT \par \par FENO was 25; normal value being less than 25\par Fractional exhaled nitric oxide (FENO) is regarded as a simple, noninvasive method for assessing eosinophilic airway inflammation. Produced by a variety of cells within the lung, nitric oxide (NO) concentrations are generally low in healthy individuals. However, high concentrations of NO appear to be involved in nonspecific host defense mechanisms and chronic inflammatory diseases such as asthma. The American Thoracic Society (ATS) therefore has recommended using FENO to aid in the diagnosis and monitoring of eosinophilic airway inflammation and asthma, and for identifying steroid responsive individuals whose chronic respiratory symptoms may be airway inflammation. \par

## 2023-07-13 NOTE — ASSESSMENT
[FreeTextEntry1] : Ms. 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 Risks, benefits were reviewed in detail. The guidelines for abnormalities include follow up CT scans at various intervals which could range from 6 weeks to 1 year intervals. If there is a change for the worse then consideration for a biopsy will be considered if you are a candidate. Second opinion evaluation with a thoracic surgeon or an interventional radiologist could be offered. \par \par problem 2: TB\par - TB skin test- negative PPD \par \par -As for the latent TB infection, 5-13% will go on to develop active disease. No gold standard test for diagnosis. The tuberculin skin test limited sensitivity and specificity, as there is  false positives in people who have received BCG; false negatives in people with impaired T-Cell infection. Interferon gamma- release assays, more specific; similar sensitivity to TB skin test (not influenced by BCG).\par \par problem 3: GERD/ LPR \par -continue to use Pepcid 30 mg QAM \par -recommended to use DGL before meals\par -hand outs provided to the pt \par \par -Rule of 2s: avoid eating too much, eating too late, eating too spicy, eating two hours before bed\par -Things to avoid including overeating, spicy foods, tight clothing, eating within three hours of bed, this list is not all inclusive. \par -For treatment of reflux, possible options discussed including diet control, H2 blockers, PPIs, as well as coating motility agents discussed as treatment options. Timing of meals and proximity of last meal to sleep were discussed. If symptoms persist, a formal gastrointestinal evaluation is needed. \par \par Problem 4: Abnormal PFT's \par -improved at this visit and continue to improve - best at present \par \par problem 5: allergic profile\par - S/p blood work to include: IgE level, eosinophil level, food IgE level, asthma profile, and vitamin D (normal) \par -recommended to use "Navage" nasal rinse device\par -continue Flonase / Astelin 0.5%  1 sniff BID \par \par problem 6: health maintenance \par -recommended SaNOtize nasal spray \par -[facility]commended [facility] on [facility] \par -recommended Topircin cream\par -recommended theraworx\par -s/p COVID 19 vaccine Pfizer x4 \par -Covid 19 precaution, vaccine and booster discussed at length and recommended \par - Received an influenza vaccine 2020, S/p 2022 \par -recommended strep pneumonia vaccines: Prevnar-13 vaccine, followed by Pneumo vaccine 23 one year following (completed)\par -S/p shringrix vaccine\par -recommended early intervention for URIs\par -recommended regular osteoporosis evaluations\par -recommended early dermatological evaluations\par -recommended after the age of 50 to the age of 70, colonoscopy every 5 years \par \par F/U in 9 months with SPI, and get a CT in 1 year from last \par She is encouraged to call with any changes, concerns, or questions

## 2023-07-13 NOTE — PHYSICAL EXAM
[No Acute Distress] : no acute distress [Normal Oropharynx] : normal oropharynx [III] : Mallampati Class: III [Normal Appearance] : normal appearance [No Neck Mass] : no neck mass [Normal Rate/Rhythm] : normal rate/rhythm [Normal S1, S2] : normal s1, s2 [No Resp Distress] : no resp distress [Clear to Auscultation Bilaterally] : clear to auscultation bilaterally [No Abnormalities] : no abnormalities [Benign] : benign [Normal Gait] : normal gait [No Clubbing] : no clubbing [No Cyanosis] : no cyanosis [No Edema] : no edema [FROM] : FROM [Normal Color/ Pigmentation] : normal color/ pigmentation [No Focal Deficits] : no focal deficits [Oriented x3] : oriented x3 [Normal Affect] : normal affect [TextBox_54] : 2/6 systolic murmur  [TextBox_68] : I:E ratio 1:3; clear

## 2023-07-13 NOTE — HISTORY OF PRESENT ILLNESS
[FreeTextEntry1] : Ms. Peterson is a 77 year old female with a history of abnormal chest CT, allergic reaction, asbestos exposure, GERD and palpitations presenting to the office today for a follow up visit. Her chief complaint is\par - she noes she has been feeling okay \par - she notes she has not been eating as much and has lost weight \par - she notes her energy levels are okay\par - no palpitations\par - no hoarseness \par - no ankle / leg swelling \par - no muscle aches / pains \par - she notes having some arthritis \par - she notes she exercises when she can \par - she notes she walks with bands \par - she notes her sinuses have been okay, no leaky or congested\par - denies taking any new medications, vitamins, or supplements. \par - she notes her bowels are off \par -She denies any visual issues, headaches, nausea, vomiting, fever, chills, sweats, chest pains, chest pressure, diarrhea, constipation, dysphagia, myalgia, dizziness, leg swelling, leg pain, itchy eyes, itchy ears, heartburn, reflux, or sour taste in the mouth.\par

## 2024-01-04 ENCOUNTER — APPOINTMENT (OUTPATIENT)
Dept: PULMONOLOGY | Facility: CLINIC | Age: 79
End: 2024-01-04
Payer: MEDICARE

## 2024-01-04 VITALS
HEIGHT: 62 IN | SYSTOLIC BLOOD PRESSURE: 122 MMHG | TEMPERATURE: 97.1 F | RESPIRATION RATE: 16 BRPM | HEART RATE: 55 BPM | WEIGHT: 108 LBS | BODY MASS INDEX: 19.88 KG/M2 | DIASTOLIC BLOOD PRESSURE: 70 MMHG | OXYGEN SATURATION: 99 %

## 2024-01-04 DIAGNOSIS — Z77.090 CONTACT WITH AND (SUSPECTED) EXPOSURE TO ASBESTOS: ICD-10-CM

## 2024-01-04 DIAGNOSIS — T78.40XA ALLERGY, UNSPECIFIED, INITIAL ENCOUNTER: ICD-10-CM

## 2024-01-04 DIAGNOSIS — R94.2 ABNORMAL RESULTS OF PULMONARY FUNCTION STUDIES: ICD-10-CM

## 2024-01-04 DIAGNOSIS — R93.89 ABNORMAL FINDINGS ON DIAGNOSTIC IMAGING OF OTHER SPECIFIED BODY STRUCTURES: ICD-10-CM

## 2024-01-04 DIAGNOSIS — K21.9 GASTRO-ESOPHAGEAL REFLUX DISEASE W/OUT ESOPHAGITIS: ICD-10-CM

## 2024-01-04 PROCEDURE — 95012 NITRIC OXIDE EXP GAS DETER: CPT

## 2024-01-04 PROCEDURE — 99214 OFFICE O/P EST MOD 30 MIN: CPT | Mod: 25

## 2024-01-04 PROCEDURE — 94010 BREATHING CAPACITY TEST: CPT

## 2024-01-04 NOTE — PROCEDURE
[FreeTextEntry1] : CT Chest (12.28.2023) revealed stable partially calcified biapical pleural-parenchymal thickening including some nodular thickening along the superior aspect of the right oblique fissure. This is most likely related to post infectious or inflammatory changes. No intrathoracic lymphadenopathy. Mild coronary atherosclerosis  PFT reveals normal flows, with an FEV1 of 1.86 L, which is 103.9% of predicted, with a normal flow volume loop. PFTs were performed to evaluate for SOB  FENO was 24; a normal value being less than 25 Fractional exhaled nitric oxide (FENO) is regarded as a simple, noninvasive method for assessing eosinophilic airway inflammation. Produced by a variety of cells within the lung, nitric oxide (NO) concentrations are generally low in healthy individuals. However, high concentrations of NO appear to be involved in nonspecific host defense mechanisms and chronic inflammatory diseases such as asthma. The American Thoracic Society (ATS) therefore has strongly recommended using FENO to aid in the assessment, management, and long-term monitoring of eosinophilic airway inflammation and asthma, and for identifying steroid responsive individuals whose chronic respiratory symptoms may be caused by airway inflammation. In their 2011 clinical practice guideline, the ATS emphasizes the importance of using FENO.

## 2024-01-04 NOTE — HISTORY OF PRESENT ILLNESS
[FreeTextEntry1] : Ms. Peterson is a 78 year old female with a history of abnormal chest CT, allergic reaction, asbestos exposure, GERD and palpitations presenting to the office today for a follow up visit. Her chief complaint is  - she notes feeling generally well overall  - she notes having a headache the other day - she notes she was in Afib 8/2023 and is now on Metoprolol and Eliquis  - she notes the Metoprolol brought her resting heartrate down below 50 bpm so she is only taking a half  - she notes exercising  - she notes drinking about 30 oz of water daily  -she denies any nausea, emesis, fever, chills, sweats, chest pain, chest pressure, coughing, wheezing, palpitations, constipation, diarrhea, vertigo, dysphagia, heartburn, reflux, itchy eyes, itchy ears, leg swelling, leg pain, arthralgias, myalgias, or sour taste in the mouth.

## 2024-01-04 NOTE — ADDENDUM
[FreeTextEntry1] : Documented by Gt Abreu acting as a scribe for Dr. Martín Call on 01/04/2024.   All medical record entries made by the Scribe were at my, Dr. Martín Call's, direction and personally dictated by me on 01/04/2024. I have reviewed the chart and agree that the record accurately reflects my personal performance of the history, physical exam, assessment and plan. I have also personally directed, reviewed, and agree with the discharge instructions.

## 2024-01-04 NOTE — ASSESSMENT
[FreeTextEntry1] : Ms. 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 Risks, benefits were reviewed in detail. The guidelines for abnormalities include follow up CT scans at various intervals which could range from 6 weeks to 1 year intervals. If there is a change for the worse then consideration for a biopsy will be considered if you are a candidate. Second opinion evaluation with a thoracic surgeon or an interventional radiologist could be offered.  problem 2: TB - TB skin test- negative PPD -As for the latent TB infection, 5-13% will go on to develop active disease. No gold standard test for diagnosis. The tuberculin skin test limited sensitivity and specificity, as there is false positives in people who have received BCG; false negatives in people with impaired T-Cell infection. Interferon gamma- release assays, more specific; similar sensitivity to TB skin test (not influenced by BCG).  problem 3: GERD/ LPR -continue to use Pepcid 30 mg QAM -recommended to use DGL before meals -hand outs provided to the pt -Rule of 2s: avoid eating too much, eating too late, eating too spicy, eating two hours before bed -Things to avoid including overeating, spicy foods, tight clothing, eating within three hours of bed, this list is not all inclusive. -For treatment of reflux, possible options discussed including diet control, H2 blockers, PPIs, as well as coating motility agents discussed as treatment options. Timing of meals and proximity of last meal to sleep were discussed. If symptoms persist, a formal gastrointestinal evaluation is needed.  Problem 4: Abnormal PFT's -improved at this visit and continue to improve - best at present  problem 5: allergic profile - S/p blood work to include: IgE level, eosinophil level, food IgE level, asthma profile, and vitamin D (normal) -recommended to use "Navage" nasal rinse device -continue Flonase / Astelin 0.5% 1 sniff BID  problem 6: Cardiac -Afib 8/2023 -f/p with Hakimian  problem 7: health maintenance -recommended RSV vaccine in the Fall for anyone over the age of 60 -recommended SaNOtize nasal spray -recommended [employer] Stretches on YouTube -recommended Topircin cream -recommended theraworx -s/p COVID 19 vaccine Pfizer x4 -Covid 19 precaution, vaccine and booster discussed at length and recommended - Received an influenza vaccine 2023 -recommended strep pneumonia vaccines: Prevnar-13 vaccine, followed by Pneumo vaccine 23 one year following (completed) -S/p shringrix vaccine -recommended early intervention for URIs -recommended regular osteoporosis evaluations -recommended early dermatological evaluations -recommended after the age of 50 to the age of 70, colonoscopy every 5 years  F/U in 9 months with SPI, and get a CT in 1 year from last She is encouraged to call with any changes, concerns, or questions.

## 2024-01-04 NOTE — PHYSICAL EXAM
[No Acute Distress] : no acute distress [Normal Oropharynx] : normal oropharynx [III] : Mallampati Class: III [Normal Appearance] : normal appearance [No Neck Mass] : no neck mass [Normal Rate/Rhythm] : normal rate/rhythm [Normal S1, S2] : normal s1, s2 [No Resp Distress] : no resp distress [Clear to Auscultation Bilaterally] : clear to auscultation bilaterally [No Abnormalities] : no abnormalities [Benign] : benign [Normal Gait] : normal gait [No Clubbing] : no clubbing [No Cyanosis] : no cyanosis [No Edema] : no edema [FROM] : FROM [Normal Color/ Pigmentation] : normal color/ pigmentation [No Focal Deficits] : no focal deficits [Oriented x3] : oriented x3 [Normal Affect] : normal affect [Murmur ___ / 6] : murmur [unfilled] / 6 [TextBox_54] : 2/6 systolic murmur  [TextBox_68] : I:E ratio 1:3; clear

## 2024-07-10 ENCOUNTER — APPOINTMENT (OUTPATIENT)
Dept: PULMONOLOGY | Facility: CLINIC | Age: 79
End: 2024-07-10
Payer: MEDICARE

## 2024-07-10 VITALS
TEMPERATURE: 97 F | DIASTOLIC BLOOD PRESSURE: 70 MMHG | WEIGHT: 108 LBS | BODY MASS INDEX: 19.88 KG/M2 | RESPIRATION RATE: 16 BRPM | HEIGHT: 62 IN | SYSTOLIC BLOOD PRESSURE: 120 MMHG | HEART RATE: 56 BPM | OXYGEN SATURATION: 98 %

## 2024-07-10 DIAGNOSIS — R94.2 ABNORMAL RESULTS OF PULMONARY FUNCTION STUDIES: ICD-10-CM

## 2024-07-10 DIAGNOSIS — Z77.090 CONTACT WITH AND (SUSPECTED) EXPOSURE TO ASBESTOS: ICD-10-CM

## 2024-07-10 DIAGNOSIS — R93.89 ABNORMAL FINDINGS ON DIAGNOSTIC IMAGING OF OTHER SPECIFIED BODY STRUCTURES: ICD-10-CM

## 2024-07-10 DIAGNOSIS — K21.9 GASTRO-ESOPHAGEAL REFLUX DISEASE W/OUT ESOPHAGITIS: ICD-10-CM

## 2024-07-10 DIAGNOSIS — R06.83 SNORING: ICD-10-CM

## 2024-07-10 PROCEDURE — ZZZZZ: CPT

## 2024-07-10 PROCEDURE — 99214 OFFICE O/P EST MOD 30 MIN: CPT | Mod: 25

## 2024-07-10 PROCEDURE — 94010 BREATHING CAPACITY TEST: CPT

## 2024-07-10 PROCEDURE — 94729 DIFFUSING CAPACITY: CPT

## 2024-07-10 PROCEDURE — 94727 GAS DIL/WSHOT DETER LNG VOL: CPT

## 2024-08-06 ENCOUNTER — APPOINTMENT (OUTPATIENT)
Dept: ORTHOPEDIC SURGERY | Facility: CLINIC | Age: 79
End: 2024-08-06

## 2024-08-06 PROCEDURE — 99213 OFFICE O/P EST LOW 20 MIN: CPT

## 2024-08-06 PROCEDURE — 72100 X-RAY EXAM L-S SPINE 2/3 VWS: CPT

## 2024-08-06 PROCEDURE — 99203 OFFICE O/P NEW LOW 30 MIN: CPT

## 2024-08-06 PROCEDURE — 72170 X-RAY EXAM OF PELVIS: CPT

## 2024-08-06 NOTE — REASON FOR VISIT
[FreeTextEntry2] : Patient complains of lower back pain that comes and goes for several months. No injury. Pain mostly with sitting; able to walk, walked on treadmill this morning. No c/o N/T legs or weakness. No B/B symptoms. Takes occasional Tylenol.

## 2024-08-06 NOTE — PHYSICAL EXAM
[Facet arthropathy] : Facet arthropathy [Disc space narrowing] : Disc space narrowing [Scoliosis] : Scoliosis [Spondylolithesis] : Spondylolithesis [AP] : anteroposterior [Mild arthritis (Tonnis Grade 1)] : Mild arthritis (Tonnis Grade 1) [] : non-antalgic [FreeTextEntry1] : spondylolisthesis L1L2, L4L5

## 2024-08-06 NOTE — HISTORY OF PRESENT ILLNESS
[Lower back] : lower back [4] : 4 [0] : 0 [Dull/Aching] : dull/aching [Sharp] : sharp [Shooting] : shooting [Intermittent] : intermittent [Household chores] : household chores [Leisure] : leisure [Rest] : rest [Sitting] : sitting [Walking] : walking [Retired] : Work status: retired [] : Post Surgical Visit: no

## 2024-08-06 NOTE — ASSESSMENT
[FreeTextEntry1] : due to xrays reviewed with her, would recommend MRI lumbar spine; will discuss treatment options after we obtain the results.

## 2024-08-09 ENCOUNTER — APPOINTMENT (OUTPATIENT)
Dept: PULMONOLOGY | Facility: CLINIC | Age: 79
End: 2024-08-09

## 2024-08-09 PROBLEM — G47.30 SLEEP APNEA: Status: ACTIVE | Noted: 2024-08-09

## 2024-08-09 PROCEDURE — 99442: CPT | Mod: 93

## 2024-08-21 ENCOUNTER — APPOINTMENT (OUTPATIENT)
Dept: ORTHOPEDIC SURGERY | Facility: CLINIC | Age: 79
End: 2024-08-21
Payer: MEDICARE

## 2024-08-21 VITALS — HEIGHT: 62 IN | BODY MASS INDEX: 19.88 KG/M2 | WEIGHT: 108 LBS

## 2024-08-21 DIAGNOSIS — M48.061 SPINAL STENOSIS, LUMBAR REGION WITHOUT NEUROGENIC CLAUDICATION: ICD-10-CM

## 2024-08-21 DIAGNOSIS — M43.16 SPONDYLOLISTHESIS, LUMBAR REGION: ICD-10-CM

## 2024-08-21 PROCEDURE — 99214 OFFICE O/P EST MOD 30 MIN: CPT

## 2024-08-21 NOTE — PHYSICAL EXAM
[Facet arthropathy] : Facet arthropathy [Disc space narrowing] : Disc space narrowing [Scoliosis] : Scoliosis [Spondylolithesis] : Spondylolithesis [AP] : anteroposterior [Mild arthritis (Tonnis Grade 1)] : Mild arthritis (Tonnis Grade 1) [FreeTextEntry1] : spondylolisthesis L1L2, L4L5 [] : non-antalgic

## 2024-08-21 NOTE — ASSESSMENT
[FreeTextEntry1] : MRI results discussed; may continue with yoga, takes Tylenol. Recommend stretching.

## 2024-08-21 NOTE — REASON FOR VISIT
[FreeTextEntry2] : Review MRI results of the lumbar spine Degenerative changes. No c/o N/T legs; does yoga, feeling a little better.

## 2024-11-30 ENCOUNTER — APPOINTMENT (OUTPATIENT)
Dept: CT IMAGING | Facility: CLINIC | Age: 79
End: 2024-11-30
Payer: MEDICARE

## 2024-11-30 PROCEDURE — 75572 CT HRT W/3D IMAGE: CPT | Mod: MH

## 2024-12-04 ENCOUNTER — INPATIENT (INPATIENT)
Facility: HOSPITAL | Age: 79
LOS: 0 days | Discharge: HOME | DRG: 274 | End: 2024-12-04
Attending: STUDENT IN AN ORGANIZED HEALTH CARE EDUCATION/TRAINING PROGRAM | Admitting: STUDENT IN AN ORGANIZED HEALTH CARE EDUCATION/TRAINING PROGRAM
Payer: MEDICARE

## 2024-12-04 ENCOUNTER — TRANSCRIPTION ENCOUNTER (OUTPATIENT)
Age: 79
End: 2024-12-04

## 2024-12-04 VITALS
DIASTOLIC BLOOD PRESSURE: 79 MMHG | HEART RATE: 59 BPM | OXYGEN SATURATION: 96 % | RESPIRATION RATE: 17 BRPM | SYSTOLIC BLOOD PRESSURE: 151 MMHG | TEMPERATURE: 98 F

## 2024-12-04 VITALS
OXYGEN SATURATION: 100 % | HEART RATE: 55 BPM | TEMPERATURE: 97 F | RESPIRATION RATE: 16 BRPM | SYSTOLIC BLOOD PRESSURE: 151 MMHG | HEIGHT: 63 IN | WEIGHT: 108.03 LBS | DIASTOLIC BLOOD PRESSURE: 67 MMHG

## 2024-12-04 DIAGNOSIS — Z98.49 CATARACT EXTRACTION STATUS, UNSPECIFIED EYE: Chronic | ICD-10-CM

## 2024-12-04 DIAGNOSIS — Z98.51 TUBAL LIGATION STATUS: Chronic | ICD-10-CM

## 2024-12-04 DIAGNOSIS — Z98.890 OTHER SPECIFIED POSTPROCEDURAL STATES: Chronic | ICD-10-CM

## 2024-12-04 DIAGNOSIS — I48.0 PAROXYSMAL ATRIAL FIBRILLATION: ICD-10-CM

## 2024-12-04 LAB
ANION GAP SERPL CALC-SCNC: 14 MMOL/L — SIGNIFICANT CHANGE UP (ref 5–17)
BUN SERPL-MCNC: 28 MG/DL — HIGH (ref 7–23)
CALCIUM SERPL-MCNC: 9.4 MG/DL — SIGNIFICANT CHANGE UP (ref 8.4–10.5)
CHLORIDE SERPL-SCNC: 100 MMOL/L — SIGNIFICANT CHANGE UP (ref 96–108)
CO2 SERPL-SCNC: 23 MMOL/L — SIGNIFICANT CHANGE UP (ref 22–31)
CREAT SERPL-MCNC: 0.86 MG/DL — SIGNIFICANT CHANGE UP (ref 0.5–1.3)
EGFR: 68 ML/MIN/1.73M2 — SIGNIFICANT CHANGE UP
GLUCOSE SERPL-MCNC: 105 MG/DL — HIGH (ref 70–99)
HCT VFR BLD CALC: 39.7 % — SIGNIFICANT CHANGE UP (ref 34.5–45)
HGB BLD-MCNC: 13.3 G/DL — SIGNIFICANT CHANGE UP (ref 11.5–15.5)
MCHC RBC-ENTMCNC: 31.3 PG — SIGNIFICANT CHANGE UP (ref 27–34)
MCHC RBC-ENTMCNC: 33.5 G/DL — SIGNIFICANT CHANGE UP (ref 32–36)
MCV RBC AUTO: 93.4 FL — SIGNIFICANT CHANGE UP (ref 80–100)
NRBC # BLD: 0 /100 WBCS — SIGNIFICANT CHANGE UP (ref 0–0)
PLATELET # BLD AUTO: 315 K/UL — SIGNIFICANT CHANGE UP (ref 150–400)
POTASSIUM SERPL-MCNC: 4.6 MMOL/L — SIGNIFICANT CHANGE UP (ref 3.5–5.3)
POTASSIUM SERPL-SCNC: 4.6 MMOL/L — SIGNIFICANT CHANGE UP (ref 3.5–5.3)
RBC # BLD: 4.25 M/UL — SIGNIFICANT CHANGE UP (ref 3.8–5.2)
RBC # FLD: 14.7 % — HIGH (ref 10.3–14.5)
SODIUM SERPL-SCNC: 137 MMOL/L — SIGNIFICANT CHANGE UP (ref 135–145)
WBC # BLD: 6.4 K/UL — SIGNIFICANT CHANGE UP (ref 3.8–10.5)
WBC # FLD AUTO: 6.4 K/UL — SIGNIFICANT CHANGE UP (ref 3.8–10.5)

## 2024-12-04 PROCEDURE — 93656 COMPRE EP EVAL ABLTJ ATR FIB: CPT

## 2024-12-04 PROCEDURE — 86901 BLOOD TYPING SEROLOGIC RH(D): CPT

## 2024-12-04 PROCEDURE — 86850 RBC ANTIBODY SCREEN: CPT

## 2024-12-04 PROCEDURE — C1769: CPT

## 2024-12-04 PROCEDURE — 85027 COMPLETE CBC AUTOMATED: CPT

## 2024-12-04 PROCEDURE — 93010 ELECTROCARDIOGRAM REPORT: CPT

## 2024-12-04 PROCEDURE — C1893: CPT

## 2024-12-04 PROCEDURE — C9399: CPT

## 2024-12-04 PROCEDURE — C1764: CPT

## 2024-12-04 PROCEDURE — 33285 INSJ SUBQ CAR RHYTHM MNTR: CPT

## 2024-12-04 PROCEDURE — 93609 INTRA-VNTR MAPG TCHYCAR SITE: CPT

## 2024-12-04 PROCEDURE — 93657 TX L/R ATRIAL FIB ADDL: CPT

## 2024-12-04 PROCEDURE — 93613 INTRACARDIAC EPHYS 3D MAPG: CPT

## 2024-12-04 PROCEDURE — 80048 BASIC METABOLIC PNL TOTAL CA: CPT

## 2024-12-04 PROCEDURE — C1766: CPT

## 2024-12-04 PROCEDURE — 93623 PRGRMD STIMJ&PACG IV RX NFS: CPT

## 2024-12-04 PROCEDURE — C1759: CPT

## 2024-12-04 PROCEDURE — 86900 BLOOD TYPING SEROLOGIC ABO: CPT

## 2024-12-04 PROCEDURE — C1730: CPT

## 2024-12-04 PROCEDURE — 93005 ELECTROCARDIOGRAM TRACING: CPT

## 2024-12-04 PROCEDURE — C1733: CPT

## 2024-12-04 PROCEDURE — C1732: CPT

## 2024-12-04 PROCEDURE — C1894: CPT

## 2024-12-04 RX ORDER — APIXABAN 2.5 MG/1
1 TABLET, FILM COATED ORAL
Refills: 0 | DISCHARGE

## 2024-12-04 RX ORDER — BACITRACIN ZINC 500 UNIT/G
1 OINTMENT (GRAM) TOPICAL ONCE
Refills: 0 | Status: COMPLETED | OUTPATIENT
Start: 2024-12-04 | End: 2024-12-04

## 2024-12-04 RX ORDER — APIXABAN 2.5 MG/1
2.5 TABLET, FILM COATED ORAL EVERY 12 HOURS
Refills: 0 | Status: DISCONTINUED | OUTPATIENT
Start: 2024-12-04 | End: 2024-12-04

## 2024-12-04 RX ORDER — METOPROLOL TARTRATE 100 MG/1
1 TABLET, FILM COATED ORAL
Refills: 0 | DISCHARGE

## 2024-12-04 RX ADMIN — APIXABAN 2.5 MILLIGRAM(S): 2.5 TABLET, FILM COATED ORAL at 20:24

## 2024-12-04 RX ADMIN — Medication 1 APPLICATION(S): at 20:00

## 2024-12-04 NOTE — H&P CARDIOLOGY - HISTORY OF PRESENT ILLNESS
78 y/o F with Pmhx of HLD, GERD, PAfib on Eliquis. She has symptomatic paroxysmal Afib with CHADSVASC 3. SHe had a recent follow up with Dr. Ward and presents today for PAF ablation and ILR implant.

## 2024-12-04 NOTE — H&P CARDIOLOGY - NSICDXPASTMEDICALHX_GEN_ALL_CORE_FT
PAST MEDICAL HISTORY:  GERD (gastroesophageal reflux disease)     HLD (hyperlipidemia)     Paroxysmal atrial fibrillation

## 2024-12-04 NOTE — ASU DISCHARGE PLAN (ADULT/PEDIATRIC) - FINANCIAL ASSISTANCE
[facility] provides services at a reduced cost to those who are determined to be eligible through [facility]’s financial assistance program. Information regarding [facility]’s financial assistance program can be found by going to [email or web address] or by calling [phone].

## 2024-12-04 NOTE — PATIENT PROFILE ADULT - FALL HARM RISK - HARM RISK INTERVENTIONS

## 2024-12-04 NOTE — ASU DISCHARGE PLAN (ADULT/PEDIATRIC) - NS MD DC FALL RISK RISK
For information on Fall & Injury Prevention, visit: [email or web address] OR  [email or web address] OR  https://www.cdc.gov/steadi/patient.html

## 2024-12-04 NOTE — PRE-OP CHECKLIST - NS PREOP CHK MONITOR ANESTHESIA CONSENT
RX'S ARE UP FRONT AND READY TO BE PICKED UP. PT IS AWARE.     ----- Message from Javier Amador MD sent at 9/19/2017 10:54 AM EDT -----  It's okay to go ahead and fill these        ----- Message -----     From: Shereen Iverson MA     Sent: 9/19/2017   9:25 AM       To: MD DR. DEWAYNE Rivera,  PT IS REQUESTING REFILL ON ADDERALL AND HYDROCODONE- SHE IS DUE FOR AN APPT, BUT WHEN I CALLED HER, SHE SAID SHE LOST HER JOB, AND IS WANTING TO KNOW IF SHE CAN HAVE A MONTH SUPPLY OF THESE AND SHE WILL MAKE AN APPT FOR NEXT MONTH? PLEASE ADVISE. THANK YOU.      ----- Message -----     From: Nicolasa Nogueira     Sent: 9/19/2017   8:51 AM       To: Shereen Iverson MA    REFILL ON ADDERALL 15MG AND HYDRO 10/325    PLEASE CALL PT WHEN READY [phone]        
pending

## 2024-12-04 NOTE — ASU DISCHARGE PLAN (ADULT/PEDIATRIC) - CARE PROVIDER_API CALL
Artem Ward  Cardiac Electrophysiology  [address], [address]  [place] [address]  Phone: [phone]  Follow Up Time: 2 weeks

## 2024-12-13 ENCOUNTER — TRANSCRIPTION ENCOUNTER (OUTPATIENT)
Age: 79
End: 2024-12-13

## 2024-12-17 PROBLEM — I48.0 PAROXYSMAL ATRIAL FIBRILLATION: Chronic | Status: ACTIVE | Noted: 2024-12-04

## 2024-12-17 PROBLEM — E78.5 HYPERLIPIDEMIA, UNSPECIFIED: Chronic | Status: ACTIVE | Noted: 2024-12-04

## 2024-12-17 PROBLEM — K21.9 GASTRO-ESOPHAGEAL REFLUX DISEASE WITHOUT ESOPHAGITIS: Chronic | Status: ACTIVE | Noted: 2024-12-04

## 2024-12-21 ENCOUNTER — APPOINTMENT (OUTPATIENT)
Dept: CT IMAGING | Facility: CLINIC | Age: 79
End: 2024-12-21
Payer: MEDICARE

## 2024-12-21 ENCOUNTER — OUTPATIENT (OUTPATIENT)
Dept: OUTPATIENT SERVICES | Facility: HOSPITAL | Age: 79
LOS: 1 days | End: 2024-12-21
Payer: MEDICARE

## 2024-12-21 DIAGNOSIS — Z98.890 OTHER SPECIFIED POSTPROCEDURAL STATES: Chronic | ICD-10-CM

## 2024-12-21 DIAGNOSIS — Z98.51 TUBAL LIGATION STATUS: Chronic | ICD-10-CM

## 2024-12-21 DIAGNOSIS — R93.89 ABNORMAL FINDINGS ON DIAGNOSTIC IMAGING OF OTHER SPECIFIED BODY STRUCTURES: ICD-10-CM

## 2024-12-21 DIAGNOSIS — Z98.49 CATARACT EXTRACTION STATUS, UNSPECIFIED EYE: Chronic | ICD-10-CM

## 2024-12-21 PROCEDURE — 71250 CT THORAX DX C-: CPT | Mod: 26,MH

## 2025-01-10 ENCOUNTER — APPOINTMENT (OUTPATIENT)
Dept: PULMONOLOGY | Facility: CLINIC | Age: 80
End: 2025-01-10
Payer: MEDICARE

## 2025-01-10 VITALS
WEIGHT: 108 LBS | HEIGHT: 62 IN | OXYGEN SATURATION: 95 % | HEART RATE: 55 BPM | TEMPERATURE: 36.8 F | SYSTOLIC BLOOD PRESSURE: 130 MMHG | RESPIRATION RATE: 16 BRPM | DIASTOLIC BLOOD PRESSURE: 80 MMHG | BODY MASS INDEX: 19.88 KG/M2

## 2025-01-10 DIAGNOSIS — R94.2 ABNORMAL RESULTS OF PULMONARY FUNCTION STUDIES: ICD-10-CM

## 2025-01-10 DIAGNOSIS — R93.89 ABNORMAL FINDINGS ON DIAGNOSTIC IMAGING OF OTHER SPECIFIED BODY STRUCTURES: ICD-10-CM

## 2025-01-10 DIAGNOSIS — K21.9 GASTRO-ESOPHAGEAL REFLUX DISEASE W/OUT ESOPHAGITIS: ICD-10-CM

## 2025-01-10 DIAGNOSIS — Z77.090 CONTACT WITH AND (SUSPECTED) EXPOSURE TO ASBESTOS: ICD-10-CM

## 2025-01-10 DIAGNOSIS — G47.30 SLEEP APNEA, UNSPECIFIED: ICD-10-CM

## 2025-01-10 PROCEDURE — 94010 BREATHING CAPACITY TEST: CPT

## 2025-01-10 PROCEDURE — 94729 DIFFUSING CAPACITY: CPT

## 2025-01-10 PROCEDURE — 95012 NITRIC OXIDE EXP GAS DETER: CPT

## 2025-01-10 PROCEDURE — 99214 OFFICE O/P EST MOD 30 MIN: CPT | Mod: 25

## 2025-01-10 PROCEDURE — 94727 GAS DIL/WSHOT DETER LNG VOL: CPT

## 2025-05-14 ENCOUNTER — OUTPATIENT (OUTPATIENT)
Dept: OUTPATIENT SERVICES | Facility: HOSPITAL | Age: 80
LOS: 1 days | End: 2025-05-14
Payer: MEDICARE

## 2025-05-14 ENCOUNTER — APPOINTMENT (OUTPATIENT)
Dept: CT IMAGING | Facility: CLINIC | Age: 80
End: 2025-05-14
Payer: MEDICARE

## 2025-05-14 ENCOUNTER — RESULT REVIEW (OUTPATIENT)
Age: 80
End: 2025-05-14

## 2025-05-14 DIAGNOSIS — Z98.51 TUBAL LIGATION STATUS: Chronic | ICD-10-CM

## 2025-05-14 DIAGNOSIS — R63.4 ABNORMAL WEIGHT LOSS: ICD-10-CM

## 2025-05-14 DIAGNOSIS — Z98.49 CATARACT EXTRACTION STATUS, UNSPECIFIED EYE: Chronic | ICD-10-CM

## 2025-05-14 DIAGNOSIS — Z98.890 OTHER SPECIFIED POSTPROCEDURAL STATES: Chronic | ICD-10-CM

## 2025-05-14 DIAGNOSIS — Z00.8 ENCOUNTER FOR OTHER GENERAL EXAMINATION: ICD-10-CM

## 2025-05-14 PROCEDURE — 74177 CT ABD & PELVIS W/CONTRAST: CPT | Mod: 26

## 2025-05-14 PROCEDURE — 74177 CT ABD & PELVIS W/CONTRAST: CPT

## 2025-07-21 ENCOUNTER — APPOINTMENT (OUTPATIENT)
Dept: PULMONOLOGY | Facility: CLINIC | Age: 80
End: 2025-07-21
Payer: MEDICARE

## 2025-07-21 VITALS
WEIGHT: 106 LBS | OXYGEN SATURATION: 98 % | RESPIRATION RATE: 16 BRPM | BODY MASS INDEX: 19.51 KG/M2 | SYSTOLIC BLOOD PRESSURE: 130 MMHG | TEMPERATURE: 96.5 F | HEART RATE: 60 BPM | DIASTOLIC BLOOD PRESSURE: 72 MMHG | HEIGHT: 62 IN

## 2025-07-21 DIAGNOSIS — R06.83 SNORING: ICD-10-CM

## 2025-07-21 DIAGNOSIS — R93.89 ABNORMAL FINDINGS ON DIAGNOSTIC IMAGING OF OTHER SPECIFIED BODY STRUCTURES: ICD-10-CM

## 2025-07-21 DIAGNOSIS — J30.89 OTHER ALLERGIC RHINITIS: ICD-10-CM

## 2025-07-21 DIAGNOSIS — K21.9 GASTRO-ESOPHAGEAL REFLUX DISEASE W/OUT ESOPHAGITIS: ICD-10-CM

## 2025-07-21 DIAGNOSIS — G47.30 SLEEP APNEA, UNSPECIFIED: ICD-10-CM

## 2025-07-21 DIAGNOSIS — R94.2 ABNORMAL RESULTS OF PULMONARY FUNCTION STUDIES: ICD-10-CM

## 2025-07-21 DIAGNOSIS — Z77.090 CONTACT WITH AND (SUSPECTED) EXPOSURE TO ASBESTOS: ICD-10-CM

## 2025-07-21 PROCEDURE — 94010 BREATHING CAPACITY TEST: CPT

## 2025-07-21 PROCEDURE — 99214 OFFICE O/P EST MOD 30 MIN: CPT | Mod: 25

## 2025-07-21 PROCEDURE — 95012 NITRIC OXIDE EXP GAS DETER: CPT

## 2025-07-21 RX ORDER — DESLORATADINE 5 MG/1
5 TABLET ORAL
Qty: 90 | Refills: 1 | Status: ACTIVE | COMMUNITY
Start: 2025-07-21 | End: 1900-01-01